# Patient Record
Sex: MALE | Race: WHITE | Employment: FULL TIME | URBAN - METROPOLITAN AREA
[De-identification: names, ages, dates, MRNs, and addresses within clinical notes are randomized per-mention and may not be internally consistent; named-entity substitution may affect disease eponyms.]

---

## 2019-11-01 ENCOUNTER — HOSPITAL ENCOUNTER (OUTPATIENT)
Dept: CT IMAGING | Facility: CLINIC | Age: 44
Discharge: HOME | End: 2019-11-01
Attending: FAMILY MEDICINE | Admitting: FAMILY MEDICINE

## 2019-11-01 DIAGNOSIS — E78.00 HIGH CHOLESTEROL: ICD-10-CM

## 2019-11-01 DIAGNOSIS — Z82.49 FAMILY HISTORY OF CORONARY ARTERY DISEASE: ICD-10-CM

## 2019-11-01 LAB — RADIOLOGIST FLAGS: NORMAL

## 2019-11-01 PROCEDURE — 75571 CT HRT W/O DYE W/CA TEST: CPT | Mod: GA

## 2019-11-01 PROCEDURE — 75571 CT HRT W/O DYE W/CA TEST: CPT | Mod: 26 | Performed by: INTERNAL MEDICINE

## 2019-11-05 NOTE — PROGRESS NOTES
I attempted to call Mr. Simpson with his coronary calcium results but was unable to reach him.  I was unable to leave a message.  I will attempt again later today.     Vicente Cantrell

## 2019-11-05 NOTE — PROGRESS NOTES
CT calcium coronary screening results report sent to patient via regular mail, routed to PCP, and given to imaging fellow to follow up with pt by phone.

## 2019-11-08 NOTE — PROGRESS NOTES
I have tried to reach Mr. Simpson twice since the last note at the listed home number ([phone]) but have been unable to reach him.     Vicente Cantrell  Cardiology Fellow